# Patient Record
Sex: MALE | Race: WHITE
[De-identification: names, ages, dates, MRNs, and addresses within clinical notes are randomized per-mention and may not be internally consistent; named-entity substitution may affect disease eponyms.]

---

## 2020-08-06 ENCOUNTER — HOSPITAL ENCOUNTER (EMERGENCY)
Dept: HOSPITAL 26 - MED | Age: 85
End: 2020-08-06
Payer: MEDICARE

## 2020-08-06 VITALS — WEIGHT: 280 LBS | BODY MASS INDEX: 37.11 KG/M2 | HEIGHT: 73 IN

## 2020-08-06 DIAGNOSIS — Z88.8: ICD-10-CM

## 2020-08-06 DIAGNOSIS — I46.9: Primary | ICD-10-CM

## 2020-08-06 PROCEDURE — 99291 CRITICAL CARE FIRST HOUR: CPT

## 2020-08-06 PROCEDURE — 36680 INSERT NEEDLE BONE CAVITY: CPT

## 2020-08-06 PROCEDURE — 31500 INSERT EMERGENCY AIRWAY: CPT

## 2020-08-06 NOTE — NUR
[phone] NO E[facility] FOR  TIME. 
SHE STATES WILL CALL BACK WITH ETA.

## 2020-08-06 NOTE — NUR
SPOKE TO FROILAN FROM [facility] [employer] WHO INFORMED ME THAT THEY RELEASED THE BODY TO 
MORTUARY. THEY ARE NOT INTERESTED IN DONATIONS FROM THE BODY.

## 2020-08-06 NOTE — NUR
CALLED BACK MARY FROM [facility] IN ORDER TO GET AN ETA FOR BODY PICKUP 
AFTER NOT RECIEVING A CALL BACK. TRANSFERED ME TO BEK[facility] WHO STATED THAT ETA 
FOR BODY  IS ABOUT 2 HOURS.

## 2020-08-06 NOTE — NUR
0253



PT BIBA IN FULL ARREST, ASYSTOLE. EMS PROVIDING RR VIA AMBU BAG AND 
COMPRESSIONS. PT TAKEN TO BED 10 VIA GURNEY.

## 2020-08-06 NOTE — NUR
FAMILY LEAVING BEDSIDE. SAWYER TORRES (NEXT OF KIN) INFORMED ER STAFF THAT HE 
TOOK PTS YELLOW RING AND BLACK WATCH. SAWYER UNAWARE OF PTS PREFERENCE OF 
MORTUARY AND WILL RECONTACT WHEN ABLE TO GET INFORMATION.

## 2020-08-06 NOTE — NUR
SPOKE WITH SAWYER TORRES- SON. HE GAVE INFORMATION FOR MORTUARY OF PREFERENCE. 
[facility] [address] [place] [phone].

## 2020-08-06 NOTE — NUR
PER REPORT FROM ALS PROVDER, AMBULANCE WAS CALLED TO PTS RESIDENCE FOR ALOC X2 
HOURS. PT WAS LAST SEEN NORMAL 2 HOURS AGO. PT WENT INTO ASYSTOLE IN ROUTE TO 
ED. ONLY MEDICAL HX PROVIDED BY ALS WAS CVA WITH RIGHT SIDED WEAKNESS, DM2, AND 
PULMONARY EMBOLISM.

## 2020-08-06 NOTE — NUR
PT PLACED ON CARDIAC DEFIBRILLATOR PADS. RT AT BEDSIDE, RESPIRATIONS PROVIDED 
VIA AMBU BAG. EMT PROVIDING COMPRESSIONS.

## 2020-08-06 NOTE — NUR
SPOKE TO MARY FROM [facility] IN ORDER TO GET AN ETA FOR BODY PICKUP. 
MARY STATED THAT THEY WILL BE CALLING BACK WITHIN 5 MINUTES TO GIVE ETA TIME.